# Patient Record
Sex: FEMALE | URBAN - METROPOLITAN AREA
[De-identification: names, ages, dates, MRNs, and addresses within clinical notes are randomized per-mention and may not be internally consistent; named-entity substitution may affect disease eponyms.]

---

## 2024-08-23 ENCOUNTER — OFFICE VISIT (OUTPATIENT)
Dept: URBAN - METROPOLITAN AREA CLINIC 80 | Facility: CLINIC | Age: 89
End: 2024-08-23

## 2024-09-04 ENCOUNTER — OFFICE VISIT (OUTPATIENT)
Dept: URBAN - METROPOLITAN AREA CLINIC 80 | Facility: CLINIC | Age: 89
End: 2024-09-04
Payer: MEDICARE

## 2024-09-04 ENCOUNTER — DASHBOARD ENCOUNTERS (OUTPATIENT)
Age: 89
End: 2024-09-04

## 2024-09-04 VITALS
SYSTOLIC BLOOD PRESSURE: 176 MMHG | HEART RATE: 84 BPM | HEIGHT: 62 IN | WEIGHT: 112.8 LBS | BODY MASS INDEX: 20.76 KG/M2 | TEMPERATURE: 97.7 F | DIASTOLIC BLOOD PRESSURE: 101 MMHG

## 2024-09-04 DIAGNOSIS — R10.84 GENERALIZED ABDOMINAL PAIN: ICD-10-CM

## 2024-09-04 PROCEDURE — 99203 OFFICE O/P NEW LOW 30 MIN: CPT | Performed by: INTERNAL MEDICINE

## 2024-09-04 RX ORDER — DICYCLOMINE HYDROCHLORIDE 10 MG/1
1 TABLET CAPSULE ORAL
Qty: 20 | Refills: 1

## 2024-09-04 NOTE — PHYSICAL EXAM CHEST:
no lesions, no deformities, no traumatic injuries, chest wall non-tender, no masses present, breathing is unlabored without accessory muscle use
03-Apr-2023 09:35

## 2024-09-04 NOTE — HPI-TODAY'S VISIT:
She comes in today to discuss recent issues with abdominal cramping and discomfort. She notes a lot of stress as her younger sister is dealing with cancer and she has recently lost several close friends. She was having bouts of abdominal discomfort and cramping followed by diarrhea. She discussed this with her PCP and she was given dicyclomine. She feels this has definately helped her and overall she is feeling much better. She reports she had recent labs which were normal.

## 2024-09-20 ENCOUNTER — TELEPHONE ENCOUNTER (OUTPATIENT)
Dept: URBAN - METROPOLITAN AREA CLINIC 80 | Facility: CLINIC | Age: 89
End: 2024-09-20